# Patient Record
Sex: FEMALE | URBAN - METROPOLITAN AREA
[De-identification: names, ages, dates, MRNs, and addresses within clinical notes are randomized per-mention and may not be internally consistent; named-entity substitution may affect disease eponyms.]

---

## 2023-07-27 ENCOUNTER — WEB ENCOUNTER (OUTPATIENT)
Dept: URBAN - METROPOLITAN AREA CLINIC 113 | Facility: CLINIC | Age: 23
End: 2023-07-27

## 2023-07-27 ENCOUNTER — OFFICE VISIT (OUTPATIENT)
Dept: URBAN - METROPOLITAN AREA CLINIC 113 | Facility: CLINIC | Age: 23
End: 2023-07-27
Payer: COMMERCIAL

## 2023-07-27 VITALS
DIASTOLIC BLOOD PRESSURE: 82 MMHG | HEART RATE: 74 BPM | BODY MASS INDEX: 29.82 KG/M2 | TEMPERATURE: 96.9 F | HEIGHT: 65 IN | SYSTOLIC BLOOD PRESSURE: 112 MMHG | RESPIRATION RATE: 16 BRPM | WEIGHT: 179 LBS

## 2023-07-27 DIAGNOSIS — R10.84 ABDOMINAL CRAMPING, GENERALIZED: ICD-10-CM

## 2023-07-27 DIAGNOSIS — K59.09 CHRONIC CONSTIPATION: ICD-10-CM

## 2023-07-27 PROCEDURE — 99204 OFFICE O/P NEW MOD 45 MIN: CPT | Performed by: INTERNAL MEDICINE

## 2023-07-27 PROCEDURE — 99244 OFF/OP CNSLTJ NEW/EST MOD 40: CPT | Performed by: INTERNAL MEDICINE

## 2023-07-27 RX ORDER — CALCIUM CARBONATE 300MG(750)
AS DIRECTED TABLET,CHEWABLE ORAL
Status: ACTIVE | COMMUNITY

## 2023-07-27 RX ORDER — BUPROPION HYDROCHLORIDE 300 MG/1
1 TABLET IN THE MORNING TABLET, EXTENDED RELEASE ORAL ONCE A DAY
Status: ACTIVE | COMMUNITY

## 2023-07-27 RX ORDER — ACETAZOLAMIDE 250 MG/1
1 TABLET AS NEEDED TABLET ORAL ONCE A DAY
Status: ACTIVE | COMMUNITY

## 2023-07-27 RX ORDER — DICYCLOMINE HYDROCHLORIDE 10 MG/1
1 CAPSULE CAPSULE ORAL
Qty: 90 | Refills: 1 | OUTPATIENT
Start: 2023-07-27 | End: 2023-09-25

## 2023-07-27 NOTE — HPI-TODAY'S VISIT:
22-year-old female is referred by Dr. Ramirez for evaluation of abdominal pain.  A copy of today's visit will be forwarded to the referring provider. Pelvic ultrasound was unremarkable.  Endometriosis has not been completely ruled out though she was recommended GI referral to rule out GI causes as pain sounds similar to gas or constipation.  CT scan of the abdomen/pelvis without contrast 4/16/2023: Distended urinary bladder.  Constipation.  Otherwise, unremarkable.  Labs 7/25/2023:Unremarkable CBC, normal sed rate, normal BMP, negative syphilis IgG/IgM antibody.  MRI of the brain/orbits 7/25/2023:No acute intracranial intra orbital abnormality.  Incidental 8 mm pineal cyst.  She has been referred to neurology for evaluation of headaches and recent finding of papilledema. She has not been able to get in with neurology. She has called several times. She does not have a PCP. She plans to establish to aid in referral to neurology.  She does have constipation predominant bowel habits. This has improved to every other day bowel movements after changing her diet. She has occasional rectal bleeding. She does have intermittent lower abdominal pain. This is both sharp and dull. This worsens around her period. She found out her OCP was worsening her pain. She stopped the medication about a month ago. Bowel movements help the pain. Eating does not seem to affect the pain. SHe was told her pineal cyst could be affecting her appetite. She does have occasional nausea, not typically associated with vomiting. Denies heartburn or difficulty swallowing. She has never had a colonoscopy or EGD.

## 2023-07-28 LAB
A/G RATIO: 2
ABSOLUTE BASOPHILS: 38
ABSOLUTE EOSINOPHILS: 120
ABSOLUTE LYMPHOCYTES: 1872
ABSOLUTE MONOCYTES: 389
ABSOLUTE NEUTROPHILS: 2381
ALBUMIN: 4.7
ALKALINE PHOSPHATASE: 72
ALT (SGPT): 17
AST (SGOT): 15
BASOPHILS: 0.8
BILIRUBIN, TOTAL: 0.4
BUN/CREATININE RATIO: (no result)
BUN: 17
C-REACTIVE PROTEIN, QUANT: 1.1
CALCIUM: 9.6
CARBON DIOXIDE, TOTAL: 22
CHLORIDE: 108
CREATININE: 0.84
EGFR: 101
EOSINOPHILS: 2.5
GLOBULIN, TOTAL: 2.4
GLUCOSE: 82
HEMATOCRIT: 38.3
HEMOGLOBIN: 13
LYMPHOCYTES: 39
MCH: 32.5
MCHC: 33.9
MCV: 95.8
MONOCYTES: 8.1
MPV: 9
NEUTROPHILS: 49.6
PLATELET COUNT: 387
POTASSIUM: 4.2
PROTEIN, TOTAL: 7.1
RDW: 11.6
RED BLOOD CELL COUNT: 4
SED RATE BY MODIFIED: 6
SODIUM: 137
TSH W/REFLEX TO FT4: 1.64
WHITE BLOOD CELL COUNT: 4.8

## 2023-08-24 ENCOUNTER — ERX REFILL RESPONSE (OUTPATIENT)
Dept: URBAN - METROPOLITAN AREA CLINIC 113 | Facility: CLINIC | Age: 23
End: 2023-08-24

## 2023-08-24 RX ORDER — DICYCLOMINE HYDROCHLORIDE 10 MG/1
1 CAPSULE ORALLY THREE TIMES A DAY AS NEEDED 30 DAY(S) CAPSULE ORAL
Qty: 90 CAPSULE | Refills: 1 | OUTPATIENT

## 2023-08-24 RX ORDER — DICYCLOMINE HYDROCHLORIDE 10 MG/1
1 CAPSULE CAPSULE ORAL
Qty: 90 | Refills: 1 | OUTPATIENT

## 2023-09-15 ENCOUNTER — ERX REFILL RESPONSE (OUTPATIENT)
Dept: URBAN - METROPOLITAN AREA CLINIC 113 | Facility: CLINIC | Age: 23
End: 2023-09-15

## 2023-09-15 RX ORDER — DICYCLOMINE HYDROCHLORIDE 10 MG/1
TAKE 1 CAPSULE BY MOUTH THREE TIMES A DAY AS NEEDED CAPSULE ORAL
Qty: 90 CAPSULE | Refills: 3 | OUTPATIENT

## 2023-09-15 RX ORDER — DICYCLOMINE HYDROCHLORIDE 10 MG/1
1 CAPSULE ORALLY THREE TIMES A DAY AS NEEDED 30 DAY(S) CAPSULE ORAL
Qty: 90 CAPSULE | Refills: 1 | OUTPATIENT

## 2023-10-31 ENCOUNTER — OFFICE VISIT (OUTPATIENT)
Dept: URBAN - METROPOLITAN AREA CLINIC 113 | Facility: CLINIC | Age: 23
End: 2023-10-31
Payer: COMMERCIAL

## 2023-10-31 ENCOUNTER — DASHBOARD ENCOUNTERS (OUTPATIENT)
Age: 23
End: 2023-10-31

## 2023-10-31 VITALS
HEIGHT: 65 IN | DIASTOLIC BLOOD PRESSURE: 64 MMHG | SYSTOLIC BLOOD PRESSURE: 111 MMHG | HEART RATE: 68 BPM | WEIGHT: 175.6 LBS | TEMPERATURE: 97.3 F | BODY MASS INDEX: 29.26 KG/M2

## 2023-10-31 DIAGNOSIS — R10.30 LOWER ABDOMINAL PAIN: ICD-10-CM

## 2023-10-31 DIAGNOSIS — R74.8 ELEVATED LIVER ENZYMES: ICD-10-CM

## 2023-10-31 DIAGNOSIS — K59.09 CHRONIC CONSTIPATION: ICD-10-CM

## 2023-10-31 PROCEDURE — 99213 OFFICE O/P EST LOW 20 MIN: CPT

## 2023-10-31 RX ORDER — FUROSEMIDE 20 MG/1
1 TABLET TABLET ORAL ONCE A DAY
Status: ACTIVE | COMMUNITY

## 2023-10-31 RX ORDER — CALCIUM CARBONATE 300MG(750)
AS DIRECTED TABLET,CHEWABLE ORAL
Status: ON HOLD | COMMUNITY

## 2023-10-31 RX ORDER — BUPROPION HYDROCHLORIDE 300 MG/1
1 TABLET IN THE MORNING TABLET, EXTENDED RELEASE ORAL ONCE A DAY
Status: ACTIVE | COMMUNITY

## 2023-10-31 RX ORDER — DICYCLOMINE HYDROCHLORIDE 10 MG/1
TAKE 1 CAPSULE BY MOUTH THREE TIMES A DAY AS NEEDED CAPSULE ORAL
OUTPATIENT

## 2023-10-31 RX ORDER — ACETAZOLAMIDE 250 MG/1
1 TABLET AS NEEDED TABLET ORAL ONCE A DAY
Status: ON HOLD | COMMUNITY

## 2023-10-31 RX ORDER — DICYCLOMINE HYDROCHLORIDE 10 MG/1
TAKE 1 CAPSULE BY MOUTH THREE TIMES A DAY AS NEEDED CAPSULE ORAL
Qty: 90 CAPSULE | Refills: 3 | Status: ACTIVE | COMMUNITY

## 2023-10-31 NOTE — HPI-TODAY'S VISIT:
22-year-old female presents for follow-up.  She was last seen on 7/27/2023.  She was recommended increase MiraLAX to 1 capful daily and she was provided dicyclomine for relief of abdominal pain.  Labs to include thyroid studies were planned as well. Labs 7/27/2023:Normal sed rate, normal CRP, normal TSH, normal CMP, normal CBC.  Her symptoms have continued to improve while off of her OCP. She does not use Miralax daily, as she does not need it. She takes Dicyclomine prn abdominal pain which helps. She was not able to find a neurologist here in [place]. SHe has established with neurology in [facility] which is where she is from. She visits her grandparents while she is there.  7/27/2023:  22-year-old female is referred by Dr. Ramirez for evaluation of abdominal pain.  A copy of today's visit will be forwarded to the referring provider.  Pelvic ultrasound was unremarkable.  Endometriosis has not been completely ruled out though she was recommended GI referral to rule out GI causes as pain sounds similar to gas or constipation.  CT scan of the abdomen/pelvis without contrast 4/16/2023: Distended urinary bladder.  Constipation.  Otherwise, unremarkable.  Labs 7/25/2023:Unremarkable CBC, normal sed rate, normal BMP, negative syphilis IgG/IgM antibody.  MRI of the brain/orbits 7/25/2023:No acute intracranial intra orbital abnormality.  Incidental 8 mm pineal cyst.  She has been referred to neurology for evaluation of headaches and recent finding of papilledema. She has not been able to get in with neurology. She has called several times. She does not have a PCP. She plans to establish to aid in referral to neurology.  She does have constipation predominant bowel habits. This has improved to every other day bowel movements after changing her diet. She has occasional rectal bleeding. She does have intermittent lower abdominal pain. This is both sharp and dull. This worsens around her period. She found out her OCP was worsening her pain. She stopped the medication about a month ago. Bowel movements help the pain. Eating does not seem to affect the pain. SHe was told her pineal cyst could be affecting her appetite. She does have occasional nausea, not typically associated with vomiting. Denies heartburn or difficulty swallowing. She has never had a colonoscopy or EGD.